# Patient Record
Sex: FEMALE | Race: WHITE | NOT HISPANIC OR LATINO | URBAN - METROPOLITAN AREA
[De-identification: names, ages, dates, MRNs, and addresses within clinical notes are randomized per-mention and may not be internally consistent; named-entity substitution may affect disease eponyms.]

---

## 2017-10-25 ENCOUNTER — COMMUNICATION - HEALTHEAST (OUTPATIENT)
Dept: INTERNAL MEDICINE | Facility: CLINIC | Age: 52
End: 2017-10-25

## 2017-11-16 ENCOUNTER — OFFICE VISIT - HEALTHEAST (OUTPATIENT)
Dept: INTERNAL MEDICINE | Facility: CLINIC | Age: 52
End: 2017-11-16

## 2017-11-16 ENCOUNTER — AMBULATORY - HEALTHEAST (OUTPATIENT)
Dept: INTERNAL MEDICINE | Facility: CLINIC | Age: 52
End: 2017-11-16

## 2017-11-16 DIAGNOSIS — M81.0 OSTEOPOROSIS: ICD-10-CM

## 2017-11-16 DIAGNOSIS — G43.909 MIGRAINE: ICD-10-CM

## 2017-11-16 RX ORDER — SERTRALINE HYDROCHLORIDE 25 MG/1
TABLET, FILM COATED ORAL
Status: SHIPPED | COMMUNITY
Start: 2017-11-08

## 2017-11-16 RX ORDER — ALENDRONATE SODIUM 70 MG/1
70 TABLET ORAL WEEKLY
Status: SHIPPED | COMMUNITY
Start: 2017-11-09

## 2017-11-16 RX ORDER — DICLOFENAC EPOLAMINE 0.01 G/1
1 SYSTEM TOPICAL PRN
Status: SHIPPED | COMMUNITY
Start: 2017-02-15

## 2017-11-16 ASSESSMENT — MIFFLIN-ST. JEOR: SCORE: 1109.22

## 2021-05-31 VITALS — HEIGHT: 62 IN | BODY MASS INDEX: 23 KG/M2 | WEIGHT: 125 LBS

## 2021-06-14 NOTE — PROGRESS NOTES
[facility] Osteoporosis Consult note    Assessment/Plan:  1. Osteoporosis-new diagnosis-here for second opinion  Have reviewed the very detailed and thorough evaluation by Dr. Ambar Boyle to rule out secondary causes of osteoporosis.  Of note, her spine bone density is quite significantly impaired.  Have had a lengthy discussion today discussing bone metabolism, bone turnover and possible secondary causes for osteoporosis.  Her secondary evaluation has been largely negative.  Her osteoporosis is likely secondary to premature menopause, extended years on a proton pump inhibitor with decreased calcium intake and family history.  Agree with Dr. Boyle that she is definitely a candidate for anabolic therapy given her spine bone density of -3.7.  If this is not covered by insurance, then would definitely proceed with alendronate or bisphosphonate.  Additionally, have reinforced the need for 1200 mg of calcium daily in the way of either diet or supplements.  Additionally, she should be taking 2000 IUs of vitamin D 3 daily.  She is already working on balance and weightbearing exercises.  Have provided patient education materials and visual materials on osteoporosis.  She is invited to contact me if she needs any further help.      Time spent today about 45 minutes with more than half that time spent in counseling and discussion of bone health  Deborah Mina MD    Chief Complaint:  Chief Complaint   Patient presents with     Osteoporosis Consult       History of Present Illness:  Bettina is a 51 y.o. female who is here today for second opinion.  She is a very pleasant and in general healthy young woman who was recently diagnosed with osteoporosis.  She is quite concerned regarding this diagnosis and very worried about taking medication.  She was seen by Dr. Ambar Boyle.  She had a very thorough evaluation for secondary causes of osteoporosis.  The workup in general was negative.  It was felt that  it was likely secondary to decreased calcium intake growing up, small/low body mass, extended proton pump inhibitor use.  Additionally, she eats a gluten-free diet.  The results of her evaluation and conclusions were such that she should begin osteoporosis medications along with adequate calcium, vitamin D supplementation and weightbearing exercise.  Patient has approach this with a great deal of caution.  As above, she has been doing some research.  She has started on a bone growth agent that includes strontium.    Osteoporosis review of systems:  Lifestyle: She is a non-smoker.  She does not drink alcohol.  She engages in aerobic exercises 3 times per week that include walking.  She takes Pilates each week.  Sunlight exposure: Minimal.  Dietary history is reviewed.  She cannot tolerate dairy products as they exacerbate her migraine headaches.  She has about 10 servings of cheese per week and 7 servings of green vegetables.  She does have some difficulty digesting bread and gluten.  Past medical history is reviewed.  She did have a fracture at the age of 10 but no adult fractures  Medical history significant only for Raynaud's phenomenon and endometriosis  Family history is reviewed.  Her mother had possibly osteoporosis.  She is on no high risk meds  Supplements are reviewed.  As above, she is using a bone builder supplement daily.  She uses a probiotic.  She is supplementing with vitamin D.  General medical survey is also reviewed.  Dental care is up-to-date.  She does not have any major visual problems.  Gynecologic history is reviewed.  Age of menstruation was at age 10.  Menopause occurred at age 40      DXA:     ASSESSMENT:   The BMD measured at [facility] L1-L4 is 0.732 g/cm  with a T-score of -3.7.    This patient is considered osteoporotic according to World Health   Organization (WHO) criteria.  Fracture risk is high.  Pharmacological   treatment, if not already prescribed, should be started.  A follow up  "bone   density test is recommended in one year to monitor response to therapy.    Review of Systems:  A comprehensive review of systems was performed and was otherwise negative    PFSH:  Social History: She is  with one child.  She works as a .  History   Smoking Status     Never Smoker   Smokeless Tobacco     Not on file       Past History: Largely unremarkable.  She has had some issues with dairy intolerance.  Current Outpatient Prescriptions   Medication Sig Dispense Refill     alendronate (FOSAMAX) 70 MG tablet Take 70 mg by mouth once a week.       CALCIUM CARB/MAG/VITAMIN D3 (CALCIUM CARB-VIT D3-MAGNESIUM ORAL) Take by mouth daily.       conjugated estrogens (PREMARIN) vaginal cream 2 (two) times a week.       diclofenac epolamine (FLECTOR) 1.3 % PT12 Place 1 patch on the skin as needed.       LACTOBACILLUS COMBO NO.6 (PROBIOTIC COMPLEX ORAL) Take by mouth daily.       multivitamin (ONE A DAY) per tablet Take 1 tablet by mouth daily.       sertraline (ZOLOFT) 25 MG tablet TAKE 1 TABLET BY MOUTH EVERY MORNING. TAKE WITH 50MG TABLET FOR A TOTAL OF 75MG DAILY       sertraline (ZOLOFT) 50 MG tablet TAKE 1 TABLET BY MOUTH EVERY MORNING. TAKE WITH 25MG TABLET FOR A TOTAL OF 75MG DAILY       No current facility-administered medications for this visit.        Family History: Possibly significant for osteoporosis.  There is no family history of kidney stones    Physical Exam:  General Appearance:   She is quite pleasant and in no acute distress  Vitals:    11/16/17 1120   BP: 98/62   Patient Site: Left Arm   Patient Position: Sitting   Cuff Size: Adult Regular   Pulse: 66   Weight: 125 lb (56.7 kg)   Height: 5' 2.25\" (1.581 m)     Wt Readings from Last 3 Encounters:   11/16/17 125 lb (56.7 kg)     Body mass index is 22.68 kg/(m^2).        Data Review:    Analysis and Summary of Old Records (2): Reviewed old records    Records Requested (1): 0      Other History Summarized (from other people in the " room) (2): 0    Radiology Tests Summarized (XRAY/CT/MRI/DXA) (1): Reviewed bone density    Labs Reviewed (1): Reviewed all labs    Medicine Tests Reviewed (EKG/ECHO/COLONOSCOPY/EGD) (1): 0    Independent Review of EKG or X-RAY (2): 0    Time spent in excess of 45 minutes with more than half that time spent in counseling and discussion of bone health

## 2021-06-16 PROBLEM — M81.0 OSTEOPOROSIS: Status: ACTIVE | Noted: 2017-11-16

## 2021-06-16 PROBLEM — G43.909 MIGRAINE: Status: ACTIVE | Noted: 2017-11-16
